# Patient Record
Sex: FEMALE | Race: BLACK OR AFRICAN AMERICAN | NOT HISPANIC OR LATINO | URBAN - METROPOLITAN AREA
[De-identification: names, ages, dates, MRNs, and addresses within clinical notes are randomized per-mention and may not be internally consistent; named-entity substitution may affect disease eponyms.]

---

## 2021-06-03 ENCOUNTER — OFFICE VISIT (OUTPATIENT)
Dept: URBAN - METROPOLITAN AREA CLINIC 40 | Facility: CLINIC | Age: 36
End: 2021-06-03

## 2021-09-13 ENCOUNTER — OFFICE VISIT (OUTPATIENT)
Dept: URBAN - METROPOLITAN AREA CLINIC 74 | Facility: CLINIC | Age: 36
End: 2021-09-13
Payer: COMMERCIAL

## 2021-09-13 ENCOUNTER — WEB ENCOUNTER (OUTPATIENT)
Dept: URBAN - METROPOLITAN AREA CLINIC 74 | Facility: CLINIC | Age: 36
End: 2021-09-13

## 2021-09-13 DIAGNOSIS — K62.5 BLOOD PER RECTUM: ICD-10-CM

## 2021-09-13 DIAGNOSIS — A04.8 H. PYLORI INFECTION: ICD-10-CM

## 2021-09-13 PROBLEM — 721730009: Status: ACTIVE | Noted: 2021-09-13

## 2021-09-13 PROCEDURE — 99204 OFFICE O/P NEW MOD 45 MIN: CPT | Performed by: STUDENT IN AN ORGANIZED HEALTH CARE EDUCATION/TRAINING PROGRAM

## 2021-09-13 RX ORDER — POLYETHYLENE GLYCOL 3350, SODIUM SULFATE, SODIUM CHLORIDE, POTASSIUM CHLORIDE, ASCORBIC ACID, SODIUM ASCORBATE 140-9-5.2G
AS DIRECTED KIT ORAL ONCE
Qty: 1 KIT | Refills: 0 | OUTPATIENT
Start: 2021-09-13 | End: 2021-09-14

## 2021-09-13 RX ORDER — IBUPROFEN 800 MG/1
1 TABLET WITH FOOD OR MILK AS NEEDED TABLET ORAL THREE TIMES A DAY
Status: ACTIVE | COMMUNITY

## 2021-09-13 NOTE — HPI-TODAY'S VISIT:
36-year-old female New to me Comes in for evaluation of blood per rectum. Patient states that she intermittently sees small amount of blood on the toilet paper and sometimes in the stool.  This happened about an ear before when she was having diarrhea episodes but then settled down and hence she never reached out to any medical specialist.  Symptoms reoccurred recently with no obvious trigger factor.  Denies any constipation or straining or diarrhea event.  No pain in rectum area or abdomen.  Reports chronic nausea and recently had a positive H. pylori breath test for which she was treated with antibiotics.  She finished her antibiotics last week.  Denies reflux or dysphagia.  No abdomen pain.  Overall good appetite and denies any unintentional weight loss. No family history of GI malignancy. No anticoagulation. No NSAID use.

## 2021-10-21 ENCOUNTER — OFFICE VISIT (OUTPATIENT)
Dept: URBAN - METROPOLITAN AREA SURGERY CENTER 30 | Facility: SURGERY CENTER | Age: 36
End: 2021-10-21

## 2021-12-16 ENCOUNTER — OFFICE VISIT (OUTPATIENT)
Dept: URBAN - METROPOLITAN AREA SURGERY CENTER 30 | Facility: SURGERY CENTER | Age: 36
End: 2021-12-16

## 2023-05-19 ENCOUNTER — OFFICE VISIT (OUTPATIENT)
Dept: URBAN - METROPOLITAN AREA TELEHEALTH 2 | Facility: TELEHEALTH | Age: 38
End: 2023-05-19
Payer: COMMERCIAL

## 2023-05-19 ENCOUNTER — LAB OUTSIDE AN ENCOUNTER (OUTPATIENT)
Dept: URBAN - METROPOLITAN AREA TELEHEALTH 2 | Facility: TELEHEALTH | Age: 38
End: 2023-05-19

## 2023-05-19 VITALS — WEIGHT: 245 LBS | HEIGHT: 62 IN | BODY MASS INDEX: 45.08 KG/M2

## 2023-05-19 DIAGNOSIS — K59.01 CONSTIPATION: ICD-10-CM

## 2023-05-19 DIAGNOSIS — K21.9 GERD: ICD-10-CM

## 2023-05-19 DIAGNOSIS — E66.01 MORBID OBESITY: ICD-10-CM

## 2023-05-19 DIAGNOSIS — Z86.19 HISTORY OF HELICOBACTER INFECTION: ICD-10-CM

## 2023-05-19 PROCEDURE — 99442 PHONE E/M BY PHYS 11-20 MIN: CPT | Performed by: INTERNAL MEDICINE

## 2023-05-19 RX ORDER — IBUPROFEN 800 MG/1
1 TABLET WITH FOOD OR MILK AS NEEDED TABLET ORAL THREE TIMES A DAY
Status: ON HOLD | COMMUNITY

## 2023-05-19 NOTE — HPI-TODAY'S VISIT:
Ms. Christianson is a 37-year-old black female who presents to telehealth visit today with complaint of severe heartburn and reflux with gas and belching.  History of morbid obesity, anxiety, now well controlled and not using medication.  States that she has no vomiting or dysphagia but does have significant reflux and significant belching since young age.  No prior EGD.  Recall she does have history of H. pylori and states that after treatment, she did not follow-up in our office but rather her primary medical doctor who may have repeat a breath test and that may have also been positive.  No repeat treatment.  Denies use of any other over-the-counter medications and rare use of ibuprofen.  Non-smoker.  Rare alcohol.  No IV or recreational drug use.  Good appetite and weight fairly stable.  She admits that omeprazole 20 mg has been significantly helpful in reducing GERD symptoms.  She however complains of occasional "knots" all over the stomach when she is constipated that is alleviated with spontaneous bowel movement.  Denies any hematochezia or melena.

## 2023-06-02 ENCOUNTER — TELEPHONE ENCOUNTER (OUTPATIENT)
Dept: URBAN - METROPOLITAN AREA CLINIC 74 | Facility: CLINIC | Age: 38
End: 2023-06-02

## 2023-06-28 ENCOUNTER — TELEPHONE ENCOUNTER (OUTPATIENT)
Dept: URBAN - METROPOLITAN AREA CLINIC 40 | Facility: CLINIC | Age: 38
End: 2023-06-28

## 2023-08-04 ENCOUNTER — CLAIMS CREATED FROM THE CLAIM WINDOW (OUTPATIENT)
Dept: URBAN - METROPOLITAN AREA CLINIC 4 | Facility: CLINIC | Age: 38
End: 2023-08-04
Payer: COMMERCIAL

## 2023-08-04 ENCOUNTER — OFFICE VISIT (OUTPATIENT)
Dept: URBAN - METROPOLITAN AREA SURGERY CENTER 30 | Facility: SURGERY CENTER | Age: 38
End: 2023-08-04
Payer: COMMERCIAL

## 2023-08-04 DIAGNOSIS — K29.60 ADENOPAPILLOMATOSIS GASTRICA: ICD-10-CM

## 2023-08-04 DIAGNOSIS — K21.9 ACID REFLUX: ICD-10-CM

## 2023-08-04 DIAGNOSIS — K21.9 GASTRO-ESOPHAGEAL REFLUX DISEASE WITHOUT ESOPHAGITIS: ICD-10-CM

## 2023-08-04 DIAGNOSIS — K31.A0 GASTRIC INTESTINAL METAPLASIA, UNSPECIFIED: ICD-10-CM

## 2023-08-04 PROCEDURE — 88312 SPECIAL STAINS GROUP 1: CPT | Performed by: PATHOLOGY

## 2023-08-04 PROCEDURE — G8907 PT DOC NO EVENTS ON DISCHARG: HCPCS | Performed by: INTERNAL MEDICINE

## 2023-08-04 PROCEDURE — 88305 TISSUE EXAM BY PATHOLOGIST: CPT | Performed by: PATHOLOGY

## 2023-08-04 PROCEDURE — 43239 EGD BIOPSY SINGLE/MULTIPLE: CPT | Performed by: INTERNAL MEDICINE

## 2023-08-04 PROCEDURE — 88342 IMHCHEM/IMCYTCHM 1ST ANTB: CPT | Performed by: PATHOLOGY

## 2023-08-14 ENCOUNTER — WEB ENCOUNTER (OUTPATIENT)
Dept: URBAN - METROPOLITAN AREA CLINIC 40 | Facility: CLINIC | Age: 38
End: 2023-08-14

## 2023-08-31 ENCOUNTER — OFFICE VISIT (OUTPATIENT)
Dept: URBAN - METROPOLITAN AREA CLINIC 40 | Facility: CLINIC | Age: 38
End: 2023-08-31

## 2023-08-31 RX ORDER — IBUPROFEN 800 MG/1
1 TABLET WITH FOOD OR MILK AS NEEDED TABLET ORAL THREE TIMES A DAY
COMMUNITY

## 2023-12-28 ENCOUNTER — OFFICE VISIT (OUTPATIENT)
Dept: URBAN - METROPOLITAN AREA CLINIC 40 | Facility: CLINIC | Age: 38
End: 2023-12-28

## 2023-12-28 RX ORDER — ERGOCALCIFEROL 1.25 MG/1
CAPSULE, LIQUID FILLED ORAL
Qty: 4 CAPSULE | Status: ACTIVE | COMMUNITY

## 2023-12-28 RX ORDER — FLUTICASONE PROPIONATE 50 UG/1
SPRAY, METERED NASAL
Qty: 16 | Status: ACTIVE | COMMUNITY

## 2023-12-28 RX ORDER — IBUPROFEN 600 MG/1
TABLET ORAL
Qty: 21 TABLET | Status: ACTIVE | COMMUNITY

## 2023-12-28 RX ORDER — IBUPROFEN 800 MG/1
1 TABLET WITH FOOD OR MILK AS NEEDED TABLET ORAL THREE TIMES A DAY
COMMUNITY

## 2023-12-28 RX ORDER — OMEPRAZOLE 40 MG/1
CAPSULE, DELAYED RELEASE ORAL
Qty: 30 CAPSULE | Status: ACTIVE | COMMUNITY

## 2023-12-28 RX ORDER — CLINDAMYCIN HYDROCHLORIDE 150 MG/1
TAKE 3 CAPSULES BY MOUTH THREE TIMES DAILY FOR 10 DAYS CAPSULE ORAL
Qty: 90 EACH | Refills: 0 | Status: ACTIVE | COMMUNITY

## 2023-12-28 RX ORDER — METHYLPREDNISOLONE 4 MG/1
TABLET ORAL
Qty: 21 TABLET | Status: ACTIVE | COMMUNITY

## 2023-12-28 RX ORDER — HYDROCODONE BITARTRATE AND ACETAMINOPHEN 5; 325 MG/1; MG/1
TABLET ORAL
Qty: 10 TABLET | Status: ACTIVE | COMMUNITY

## 2024-01-24 ENCOUNTER — OFFICE VISIT (OUTPATIENT)
Dept: URBAN - METROPOLITAN AREA CLINIC 40 | Facility: CLINIC | Age: 39
End: 2024-01-24
Payer: COMMERCIAL

## 2024-01-24 ENCOUNTER — DASHBOARD ENCOUNTERS (OUTPATIENT)
Age: 39
End: 2024-01-24

## 2024-01-24 VITALS
WEIGHT: 251.4 LBS | HEART RATE: 75 BPM | HEIGHT: 62 IN | TEMPERATURE: 97.8 F | DIASTOLIC BLOOD PRESSURE: 86 MMHG | SYSTOLIC BLOOD PRESSURE: 120 MMHG | BODY MASS INDEX: 46.26 KG/M2

## 2024-01-24 DIAGNOSIS — K64.4 ANAL SKIN TAG: ICD-10-CM

## 2024-01-24 DIAGNOSIS — K44.9 HIATAL HERNIA: ICD-10-CM

## 2024-01-24 DIAGNOSIS — K59.01 CONSTIPATION: ICD-10-CM

## 2024-01-24 DIAGNOSIS — K21.9 GERD: ICD-10-CM

## 2024-01-24 PROBLEM — 8493009: Status: ACTIVE | Noted: 2024-01-24

## 2024-01-24 PROCEDURE — 99214 OFFICE O/P EST MOD 30 MIN: CPT | Performed by: INTERNAL MEDICINE

## 2024-01-24 RX ORDER — ERGOCALCIFEROL 1.25 MG/1
CAPSULE, LIQUID FILLED ORAL
Qty: 4 CAPSULE | Status: ACTIVE | COMMUNITY

## 2024-01-24 RX ORDER — FLUTICASONE PROPIONATE 50 UG/1
SPRAY, METERED NASAL
Qty: 16 | Status: ON HOLD | COMMUNITY

## 2024-01-24 RX ORDER — IBUPROFEN 800 MG/1
1 TABLET WITH FOOD OR MILK AS NEEDED TABLET ORAL THREE TIMES A DAY
Status: ACTIVE | COMMUNITY

## 2024-01-24 RX ORDER — IBUPROFEN 600 MG/1
TABLET ORAL
Qty: 21 TABLET | Status: ON HOLD | COMMUNITY

## 2024-01-24 RX ORDER — LIDOCAINE 50 MG/G
1 APPLICATION AS NEEDED OINTMENT TOPICAL THREE TIMES A DAY
Qty: 30 | Refills: 2 | OUTPATIENT
Start: 2024-01-24

## 2024-01-24 RX ORDER — CLINDAMYCIN HYDROCHLORIDE 150 MG/1
TAKE 3 CAPSULES BY MOUTH THREE TIMES DAILY FOR 10 DAYS CAPSULE ORAL
Qty: 90 EACH | Refills: 0 | Status: ON HOLD | COMMUNITY

## 2024-01-24 RX ORDER — HYDROCODONE BITARTRATE AND ACETAMINOPHEN 5; 325 MG/1; MG/1
TABLET ORAL
Qty: 10 TABLET | Status: ON HOLD | COMMUNITY

## 2024-01-24 RX ORDER — METHYLPREDNISOLONE 4 MG/1
TABLET ORAL
Qty: 21 TABLET | Status: ON HOLD | COMMUNITY

## 2024-01-24 RX ORDER — PANTOPRAZOLE SODIUM 40 MG/1
1 TABLET TABLET, DELAYED RELEASE ORAL ONCE A DAY
Qty: 90 TABLET | Refills: 3 | OUTPATIENT
Start: 2024-01-24

## 2024-01-24 RX ORDER — OMEPRAZOLE 40 MG/1
CAPSULE, DELAYED RELEASE ORAL
Qty: 30 CAPSULE | Status: ACTIVE | COMMUNITY

## 2024-01-24 RX ORDER — FAMOTIDINE 40 MG/1
1 TABLET TABLET, FILM COATED ORAL ONCE A DAY
Qty: 90 TABLET | Refills: 3 | OUTPATIENT
Start: 2024-01-24

## 2024-01-24 NOTE — PHYSICAL EXAM GASTROINTESTINAL
Abdomen , soft, nontender, nondistended , no guarding or rigidity , no masses palpable , normal bowel sounds , Liver and Spleen , no hepatomegaly present , no hepatosplenomegaly , liver nontender , spleen not palpable  , Abdomen , soft, nontender, nondistended , no guarding or rigidity , no masses palpable , normal bowel sounds , Liver and Spleen , no hepatomegaly present , no hepatosplenomegaly , liver nontender , spleen not palpable , DANDRE and anal exam with chaparone AP/MA, small anal skin tag and small hemorrhoids, no fissure,  no mass , Abdomen , soft, nontender, nondistended , no guarding or rigidity , no masses palpable , normal bowel sounds , Liver and Spleen , no hepatomegaly present , no hepatosplenomegaly , liver nontender , spleen not palpable

## 2024-01-24 NOTE — HPI-TODAY'S VISIT:
Follow up. -Ongoing GERD and gas/bloating. -Hemorrhoids, she is interesting in Rx vs Tx if possisble for p/b/i symptoms. -EGD 8/2023, mild gastritis, bx Negative for H.pylori (treated prior and erradication confirmed), Hiatal Hernia. -Hx of severe heartburn and reflux with gas and belching.  - History of morbid obesity, anxiety, now well controlled and not using medication.  - States that she has no vomiting or dysphagia but does have significant reflux and significant belching since young age.  -She does have history of H. pylori, treated prior. -Good appetite and weight fairly stable.  She admits that omeprazole 20 mg has been significantly helpful in reducing GERD symptoms.  She however complains of occasional "knots" all over the stomach when she is constipated that is alleviated with spontaneous bowel movement.  Denies any hematochezia or melena.